# Patient Record
Sex: FEMALE
[De-identification: names, ages, dates, MRNs, and addresses within clinical notes are randomized per-mention and may not be internally consistent; named-entity substitution may affect disease eponyms.]

---

## 2017-02-21 ENCOUNTER — APPOINTMENT (OUTPATIENT)
Dept: PSYCHIATRY | Facility: CLINIC | Age: 47
End: 2017-02-21

## 2017-03-06 ENCOUNTER — APPOINTMENT (OUTPATIENT)
Dept: PSYCHIATRY | Facility: CLINIC | Age: 47
End: 2017-03-06

## 2017-03-21 ENCOUNTER — APPOINTMENT (OUTPATIENT)
Dept: PSYCHIATRY | Facility: CLINIC | Age: 47
End: 2017-03-21

## 2017-03-21 DIAGNOSIS — F33.9 MAJOR DEPRESSIVE DISORDER, RECURRENT, UNSPECIFIED: ICD-10-CM

## 2017-04-18 ENCOUNTER — APPOINTMENT (OUTPATIENT)
Dept: PSYCHIATRY | Facility: CLINIC | Age: 47
End: 2017-04-18

## 2017-04-27 ENCOUNTER — APPOINTMENT (OUTPATIENT)
Dept: PSYCHIATRY | Facility: CLINIC | Age: 47
End: 2017-04-27

## 2017-04-27 RX ORDER — LAMOTRIGINE 100 MG/1
100 TABLET ORAL DAILY
Qty: 30 | Refills: 0 | Status: DISCONTINUED | COMMUNITY
Start: 2017-03-21 | End: 2017-04-27

## 2017-05-11 ENCOUNTER — APPOINTMENT (OUTPATIENT)
Dept: PSYCHIATRY | Facility: CLINIC | Age: 47
End: 2017-05-11
Payer: COMMERCIAL

## 2017-05-11 PROCEDURE — 99214 OFFICE O/P EST MOD 30 MIN: CPT

## 2017-05-11 RX ORDER — LURASIDONE HYDROCHLORIDE 20 MG/1
20 TABLET, FILM COATED ORAL
Qty: 30 | Refills: 0 | Status: DISCONTINUED | COMMUNITY
Start: 2017-04-27 | End: 2017-05-11

## 2017-06-20 ENCOUNTER — APPOINTMENT (OUTPATIENT)
Dept: PSYCHIATRY | Facility: CLINIC | Age: 47
End: 2017-06-20

## 2017-06-20 RX ORDER — METHYLPREDNISOLONE 4 MG/1
4 TABLET ORAL
Qty: 21 | Refills: 0 | Status: DISCONTINUED | COMMUNITY
Start: 2017-04-10

## 2017-06-20 RX ORDER — AZITHROMYCIN 250 MG/1
250 TABLET, FILM COATED ORAL
Qty: 6 | Refills: 0 | Status: DISCONTINUED | COMMUNITY
Start: 2017-04-10

## 2017-06-20 RX ORDER — BUPROPION HYDROCHLORIDE 300 MG/1
300 TABLET, EXTENDED RELEASE ORAL
Qty: 30 | Refills: 0 | Status: DISCONTINUED | COMMUNITY
Start: 2017-03-06

## 2017-06-20 RX ORDER — LAMOTRIGINE 25 MG/1
25 TABLET ORAL
Qty: 90 | Refills: 0 | Status: DISCONTINUED | COMMUNITY
Start: 2017-03-21

## 2017-07-21 ENCOUNTER — APPOINTMENT (OUTPATIENT)
Dept: PSYCHIATRY | Facility: CLINIC | Age: 47
End: 2017-07-21

## 2017-10-27 ENCOUNTER — APPOINTMENT (OUTPATIENT)
Dept: PSYCHIATRY | Facility: CLINIC | Age: 47
End: 2017-10-27
Payer: COMMERCIAL

## 2017-10-27 PROCEDURE — 99214 OFFICE O/P EST MOD 30 MIN: CPT

## 2018-02-22 ENCOUNTER — APPOINTMENT (OUTPATIENT)
Dept: PSYCHIATRY | Facility: CLINIC | Age: 48
End: 2018-02-22
Payer: COMMERCIAL

## 2018-02-22 PROCEDURE — 99214 OFFICE O/P EST MOD 30 MIN: CPT

## 2018-02-22 RX ORDER — LAMOTRIGINE 200 MG/1
200 TABLET ORAL
Qty: 30 | Refills: 0 | Status: DISCONTINUED | COMMUNITY
Start: 2017-07-21

## 2018-06-12 ENCOUNTER — APPOINTMENT (OUTPATIENT)
Dept: PSYCHIATRY | Facility: CLINIC | Age: 48
End: 2018-06-12
Payer: COMMERCIAL

## 2018-06-12 PROCEDURE — 99214 OFFICE O/P EST MOD 30 MIN: CPT

## 2018-06-12 RX ORDER — LAMOTRIGINE 150 MG/1
150 TABLET ORAL
Qty: 60 | Refills: 2 | Status: DISCONTINUED | COMMUNITY
Start: 2017-05-11 | End: 2018-06-12

## 2018-06-12 RX ORDER — BUPROPION HYDROCHLORIDE 150 MG/1
150 TABLET, EXTENDED RELEASE ORAL
Qty: 90 | Refills: 0 | Status: DISCONTINUED | COMMUNITY
Start: 2017-10-27

## 2018-06-12 RX ORDER — CHLORHEXIDINE GLUCONATE, 0.12% ORAL RINSE 1.2 MG/ML
0.12 SOLUTION DENTAL
Qty: 473 | Refills: 0 | Status: DISCONTINUED | COMMUNITY
Start: 2018-05-08

## 2018-10-02 ENCOUNTER — APPOINTMENT (OUTPATIENT)
Dept: PSYCHIATRY | Facility: CLINIC | Age: 48
End: 2018-10-02
Payer: COMMERCIAL

## 2018-10-02 PROCEDURE — 99214 OFFICE O/P EST MOD 30 MIN: CPT

## 2019-01-23 ENCOUNTER — APPOINTMENT (OUTPATIENT)
Dept: PSYCHIATRY | Facility: CLINIC | Age: 49
End: 2019-01-23

## 2019-02-13 ENCOUNTER — APPOINTMENT (OUTPATIENT)
Dept: PSYCHIATRY | Facility: CLINIC | Age: 49
End: 2019-02-13
Payer: COMMERCIAL

## 2019-02-13 PROCEDURE — 99214 OFFICE O/P EST MOD 30 MIN: CPT

## 2019-05-31 ENCOUNTER — APPOINTMENT (OUTPATIENT)
Dept: PSYCHIATRY | Facility: CLINIC | Age: 49
End: 2019-05-31
Payer: COMMERCIAL

## 2019-05-31 DIAGNOSIS — Z59.8 XOTHER PROBLEMS RELATED TO HOUSING AND ECONOMIC CIRCUMSTANCES: ICD-10-CM

## 2019-05-31 PROCEDURE — 99214 OFFICE O/P EST MOD 30 MIN: CPT

## 2019-05-31 SDOH — ECONOMIC STABILITY - INCOME SECURITY: OTHER PROBLEMS RELATED TO HOUSING AND ECONOMIC CIRCUMSTANCES: Z59.8

## 2019-10-21 ENCOUNTER — APPOINTMENT (OUTPATIENT)
Dept: PSYCHIATRY | Facility: CLINIC | Age: 49
End: 2019-10-21
Payer: COMMERCIAL

## 2019-10-21 DIAGNOSIS — Z63.0 PROBLEMS IN RELATIONSHIP WITH SPOUSE OR PARTNER: ICD-10-CM

## 2019-10-21 DIAGNOSIS — F31.81 BIPOLAR II DISORDER: ICD-10-CM

## 2019-10-21 DIAGNOSIS — F33.9 MAJOR DEPRESSIVE DISORDER, RECURRENT, UNSPECIFIED: ICD-10-CM

## 2019-10-21 PROCEDURE — 99214 OFFICE O/P EST MOD 30 MIN: CPT

## 2019-10-21 SDOH — SOCIAL STABILITY - SOCIAL INSECURITY: PROBLEMS IN RELATIONSHIP WITH SPOUSE OR PARTNER: Z63.0

## 2021-05-11 ENCOUNTER — APPOINTMENT (OUTPATIENT)
Dept: OTOLARYNGOLOGY | Facility: CLINIC | Age: 51
End: 2021-05-11
Payer: COMMERCIAL

## 2021-05-11 VITALS
WEIGHT: 187 LBS | TEMPERATURE: 96.4 F | BODY MASS INDEX: 34.41 KG/M2 | HEIGHT: 62 IN | SYSTOLIC BLOOD PRESSURE: 141 MMHG | HEART RATE: 77 BPM | DIASTOLIC BLOOD PRESSURE: 93 MMHG

## 2021-05-11 DIAGNOSIS — Z78.9 OTHER SPECIFIED HEALTH STATUS: ICD-10-CM

## 2021-05-11 DIAGNOSIS — J30.2 OTHER SEASONAL ALLERGIC RHINITIS: ICD-10-CM

## 2021-05-11 DIAGNOSIS — H60.63 UNSPECIFIED CHRONIC OTITIS EXTERNA, BILATERAL: ICD-10-CM

## 2021-05-11 PROCEDURE — 99072 ADDL SUPL MATRL&STAF TM PHE: CPT

## 2021-05-11 PROCEDURE — 99203 OFFICE O/P NEW LOW 30 MIN: CPT | Mod: 25

## 2021-05-11 PROCEDURE — 69210 REMOVE IMPACTED EAR WAX UNI: CPT

## 2021-05-11 RX ORDER — BUPROPION HYDROCHLORIDE 100 MG/1
TABLET, FILM COATED ORAL
Refills: 0 | Status: ACTIVE | COMMUNITY

## 2021-05-11 NOTE — ASSESSMENT
[FreeTextEntry1] : CI removed\par stop flushing ears\par rx elocon\par \par eval for correction of deviated nasal septum

## 2021-05-11 NOTE — PHYSICAL EXAM
[] : septum deviated to the left [Normal] : mucosa is normal [Midline] : trachea located in midline position [de-identified] : dry flaky skin, CI AU

## 2021-05-11 NOTE — HISTORY OF PRESENT ILLNESS
[de-identified] : 50 yr old female w frequent otitis the past few years. Assoc w swelling of ear canal and pain tx w ear drops each time. Lots of itch. Uses a bulb syringe to flush her ears when they bother her every few weeks.\par \par c/o snoring, left nasal obstruction for a few years\par +seasonal allergy\par -hx sinusitis, epistaxis\par +nasal fx 17 yrs old followed by rhinoplasty

## 2021-05-11 NOTE — REVIEW OF SYSTEMS
[Seasonal Allergies] : seasonal allergies [Ear Pain] : ear pain [Recurrent Ear Infections] : recurrent ear infections [Nasal Congestion] : nasal congestion [Problem Snoring] : problem snoring [Sinus Pressure] : sinus pressure [Snoring With Pauses] : snoring with pauses [Throat Clearing] : throat clearing [Eyes Itch] : itching of the eyes [Shortness Of Breath] : shortness of breath [Depression] : depression [Hot Flashes] : hot flashes [Negative] : Heme/Lymph [As Noted in HPI] : as noted in HPI [FreeTextEntry7] : difficulty swallowing [de-identified] : hair changes

## 2021-05-17 ENCOUNTER — APPOINTMENT (OUTPATIENT)
Dept: OTOLARYNGOLOGY | Facility: CLINIC | Age: 51
End: 2021-05-17
Payer: COMMERCIAL

## 2021-05-17 ENCOUNTER — RESULT REVIEW (OUTPATIENT)
Age: 51
End: 2021-05-17

## 2021-05-17 VITALS
SYSTOLIC BLOOD PRESSURE: 147 MMHG | HEIGHT: 62 IN | WEIGHT: 187 LBS | TEMPERATURE: 96.7 F | HEART RATE: 71 BPM | BODY MASS INDEX: 34.41 KG/M2 | DIASTOLIC BLOOD PRESSURE: 90 MMHG

## 2021-05-17 PROCEDURE — 99072 ADDL SUPL MATRL&STAF TM PHE: CPT

## 2021-05-17 PROCEDURE — 70486 CT MAXILLOFACIAL W/O DYE: CPT

## 2021-05-17 PROCEDURE — 99215 OFFICE O/P EST HI 40 MIN: CPT

## 2021-05-17 NOTE — PHYSICAL EXAM
[Midline] : trachea located in midline position [Normal] : no rashes [FreeTextEntry1] : severe leftward deviated septum particularly involving maxillary crest, right deviated septal cartilage, large turbinates [FreeTextEntry2] : sinuses nontender to percussion

## 2021-05-17 NOTE — ASSESSMENT
[FreeTextEntry1] : Reviewed and reconciled medications, allergies, PMHx, PSHx, SocHx, FMHx.\par \par h/o rhinoplasty and nasal fx at 17 years old, snoring, nasal obstruction\par difficulty breathing through the nose\par severe leftward deviated septum particularly involving maxillary crest, right deviated septal cartilage\par large turbinates\par \par Plan:\par Reviewed chart notes from Dr. Rocha. Mini CT sinus - interpreted by Dr. Sheffield and reviewed with the patient, pending radiologist review.  Flonase - 2 sprays bilaterally QD, spray laterally. Discussed r/b/a of septum and turbs.

## 2021-05-17 NOTE — REASON FOR VISIT
[Subsequent Evaluation] : a subsequent evaluation for [FreeTextEntry2] : discuss surgery deviated septum

## 2021-05-17 NOTE — HISTORY OF PRESENT ILLNESS
[de-identified] : The patient presents with h/o rhinoplasty and nasal fx at 17 years old, snoring, nasal obstruction. Pt has difficulty breathing through the nose. She denies having injured the nose after the rhinoplasty. She uses saline spray.

## 2021-05-17 NOTE — CONSULT LETTER
[Dear  ___] : Dear  [unfilled], [Courtesy Letter:] : I had the pleasure of seeing your patient, [unfilled], in my office today. [Please see my note below.] : Please see my note below. [Consult Closing:] : Thank you very much for allowing me to participate in the care of this patient.  If you have any questions, please do not hesitate to contact me. [Sincerely,] : Sincerely, [FreeTextEntry3] : Humberto Sheffield MD FACS

## 2021-05-17 NOTE — ADDENDUM
[FreeTextEntry1] : Documented by Lilly Morse acting as scribe for Dr. Sheffield on 05/17/2021.\par \par All Medical record entries made by the Scribe were at my, Dr. Sheffield, direction and personally dictated by me on 05/17/2021 . I have reviewed the chart and agree that the record accurately reflects my personal performance of the history, physical exam, assessment and plan. I have also personally directed, reviewed, and agreed with the discharge instructions.

## 2021-05-17 NOTE — PROCEDURE
[FreeTextEntry1] : CT sinus [FreeTextEntry2] : difficulty breathing [FreeTextEntry3] : deviated septum with a spur - left side touching the side wall, sinuses clear

## 2021-09-09 ENCOUNTER — APPOINTMENT (OUTPATIENT)
Dept: OTOLARYNGOLOGY | Facility: AMBULATORY MEDICAL SERVICES | Age: 51
End: 2021-09-09

## 2021-09-17 ENCOUNTER — APPOINTMENT (OUTPATIENT)
Dept: OTOLARYNGOLOGY | Facility: CLINIC | Age: 51
End: 2021-09-17

## 2021-11-18 ENCOUNTER — APPOINTMENT (OUTPATIENT)
Dept: OTOLARYNGOLOGY | Facility: AMBULATORY MEDICAL SERVICES | Age: 51
End: 2021-11-18
Payer: COMMERCIAL

## 2021-11-18 DIAGNOSIS — G89.18 OTHER ACUTE POSTPROCEDURAL PAIN: ICD-10-CM

## 2021-11-18 PROCEDURE — 31240 NSL/SNS NDSC CNCH BULL RESCJ: CPT | Mod: 50

## 2021-11-18 PROCEDURE — 31040 EXPLORATION BEHIND UPPER JAW: CPT | Mod: 50

## 2021-11-18 PROCEDURE — 30520 REPAIR OF NASAL SEPTUM: CPT

## 2021-11-29 ENCOUNTER — APPOINTMENT (OUTPATIENT)
Dept: OTOLARYNGOLOGY | Facility: CLINIC | Age: 51
End: 2021-11-29
Payer: COMMERCIAL

## 2021-11-29 VITALS
WEIGHT: 194 LBS | HEART RATE: 94 BPM | HEIGHT: 62 IN | SYSTOLIC BLOOD PRESSURE: 145 MMHG | DIASTOLIC BLOOD PRESSURE: 90 MMHG | BODY MASS INDEX: 35.7 KG/M2

## 2021-11-29 PROCEDURE — 99024 POSTOP FOLLOW-UP VISIT: CPT

## 2021-11-29 RX ORDER — ACETAMINOPHEN AND CODEINE 300; 30 MG/1; MG/1
300-30 TABLET ORAL
Qty: 30 | Refills: 0 | Status: COMPLETED | COMMUNITY
Start: 2021-11-18 | End: 2021-11-29

## 2021-11-29 RX ORDER — CEPHALEXIN 500 MG/1
500 CAPSULE ORAL
Qty: 20 | Refills: 0 | Status: COMPLETED | COMMUNITY
Start: 2021-11-18 | End: 2021-11-29

## 2021-11-29 RX ORDER — FLUTICASONE PROPIONATE 50 UG/1
50 SPRAY, METERED NASAL
Qty: 16 | Refills: 5 | Status: COMPLETED | COMMUNITY
Start: 2021-05-17 | End: 2021-11-29

## 2021-11-29 RX ORDER — MOMETASONE FUROATE 1 MG/G
0.1 CREAM TOPICAL TWICE DAILY
Qty: 1 | Refills: 1 | Status: COMPLETED | COMMUNITY
Start: 2021-05-11 | End: 2021-11-29

## 2021-11-29 NOTE — PHYSICAL EXAM
[Normal] : normal appearance, well groomed, well nourished, and in no acute distress [FreeTextEntry1] : Nasal splints in place, dry blood and mucous B/L

## 2021-11-29 NOTE — HISTORY OF PRESENT ILLNESS
[de-identified] : 51 y.o female presents s/p septal reconstruction, B/L SMR and partial inferior turbinectomy, and a B/l endoscopic  reduction of middle turbinate tylor bullosa on 11/18/21. She is using saline spray multiple times a day. She feels congested and some mild pain.

## 2021-11-29 NOTE — ASSESSMENT
[FreeTextEntry1] : Reviewed and reconciled meds, allergies, Pmhx, Famhx, Sochx, Surghx\par \par 51 y.o female presents s/p septal reconstruction, B/L SMR and partial inferior turbinectomy, and a B/l endoscopic  reduction of middle turbinate tylor bullosa on 11/18/21\par \par Nasal Splints in place\par Mucous/debris and dry blood suctioned and removed with bayonet\par \par \par \par Plan\par Continue saline spray\par No nose blowing\par F/u in 1 weeks for removal of nasal splints

## 2021-12-06 ENCOUNTER — APPOINTMENT (OUTPATIENT)
Dept: OTOLARYNGOLOGY | Facility: CLINIC | Age: 51
End: 2021-12-06
Payer: COMMERCIAL

## 2021-12-06 VITALS
HEART RATE: 86 BPM | DIASTOLIC BLOOD PRESSURE: 93 MMHG | BODY MASS INDEX: 35.7 KG/M2 | HEIGHT: 62 IN | SYSTOLIC BLOOD PRESSURE: 139 MMHG | WEIGHT: 194 LBS

## 2021-12-06 PROCEDURE — 99024 POSTOP FOLLOW-UP VISIT: CPT

## 2021-12-06 NOTE — HISTORY OF PRESENT ILLNESS
[de-identified] : \par 51 y.o female presents s/p septal reconstruction, B/L SMR and partial inferior turbinectomy, and a B/l endoscopic reduction of middle turbinate tylor bullosa on 11/18/21. She is using saline spray multiple times a day. She feels congested and some mild pain. \par \par Pt here to follow up, she is still using saline spray

## 2021-12-06 NOTE — ASSESSMENT
[FreeTextEntry1] : \par 51 y.o female presents s/p septal reconstruction, B/L SMR and partial inferior turbinectomy, and a B/l endoscopic reduction of middle turbinate tylor bullosa on 11/18/21. She is using saline spray multiple times a day. She feels congested and some mild pain. \par \par Pt here to follow up, she is still using saline spray\par \par stitches cut,removal of nasal splints bilaterally,\par nasal suction clear\par \par \par \par Plan:\par remaining stitches will dissolve on their own, recheck again in a few weeks, continue using saline nasal spray, \par able to blow nose \par

## 2021-12-06 NOTE — PROCEDURE
[FreeTextEntry1] : removal of stitches [FreeTextEntry2] : post surgery [FreeTextEntry3] : stitches cut, removal of nasal splints, suction of mucus, crust and debris

## 2021-12-06 NOTE — PHYSICAL EXAM
[FreeTextEntry1] : splints on both sides of the septum which will be removed [Midline] : trachea located in midline position [de-identified] : sensations intact [Normal] : ocular motility and gaze are normal

## 2021-12-22 ENCOUNTER — APPOINTMENT (OUTPATIENT)
Dept: OTOLARYNGOLOGY | Facility: CLINIC | Age: 51
End: 2021-12-22
Payer: COMMERCIAL

## 2021-12-22 VITALS
BODY MASS INDEX: 35.7 KG/M2 | HEIGHT: 62 IN | HEART RATE: 78 BPM | WEIGHT: 194 LBS | DIASTOLIC BLOOD PRESSURE: 92 MMHG | SYSTOLIC BLOOD PRESSURE: 157 MMHG

## 2021-12-22 DIAGNOSIS — J34.3 HYPERTROPHY OF NASAL TURBINATES: ICD-10-CM

## 2021-12-22 DIAGNOSIS — Z98.890 OTHER SPECIFIED POSTPROCEDURAL STATES: ICD-10-CM

## 2021-12-22 DIAGNOSIS — J34.2 DEVIATED NASAL SEPTUM: ICD-10-CM

## 2021-12-22 PROCEDURE — 99024 POSTOP FOLLOW-UP VISIT: CPT

## 2021-12-22 NOTE — REASON FOR VISIT
[Post-Operative Visit] : a post-operative visit [FreeTextEntry2] : Patient here for a post operative fu

## 2021-12-22 NOTE — PHYSICAL EXAM
[Normal] : no masses and lesions seen, face is symmetric [FreeTextEntry1] : wide open and clear. little dry mucus. can see all the way back.  [FreeTextEntry2] : Sinuses nontender to percussion.

## 2021-12-22 NOTE — ADDENDUM
[FreeTextEntry1] : Documented by Milton Cotto acting as scribe for Dr. Sheffield on 12/22/2021.\par \par All Medical record entries made by the Scribe were at my, Dr. Sheffield, direction and personally dictated by me on 12/22/2021. I have reviewed the chart and agree that the record accurately reflects my personal performance of the history, physical exam, assessment and plan. I have also personally directed, reviewed, and agreed with the discharge instructions.

## 2021-12-22 NOTE — ASSESSMENT
[FreeTextEntry1] : Reviewed and reconciled medications, allergies, PMHx, PSHx, SocHx, FMHx. \par \par s/p septal reconstruction, B/L SMR and partial inferior turbinectomy, and a B/l endoscopic reduction of middle turbinate tylor bullosa on 11/18/21- wide open and clear. little dry mucus, can see all the way back\par pathology report: all benign \par \par Plan:\par Can start blowing more. Use more steam in the shower. Continue use of saline spray. FU 3-4 months.

## 2021-12-22 NOTE — HISTORY OF PRESENT ILLNESS
[de-identified] : The patient presents s/p s/p septal reconstruction, B/L SMR and partial inferior turbinectomy, and a B/l endoscopic reduction of middle turbinate tylor bullosa on 11/18/21. Pt reports doing much better. Pt states that she does feel a little plug sensation sometimes in her nose.

## 2022-03-29 ENCOUNTER — APPOINTMENT (OUTPATIENT)
Dept: OTOLARYNGOLOGY | Facility: CLINIC | Age: 52
End: 2022-03-29
Payer: COMMERCIAL

## 2022-03-29 VITALS
HEIGHT: 62 IN | WEIGHT: 200 LBS | DIASTOLIC BLOOD PRESSURE: 94 MMHG | SYSTOLIC BLOOD PRESSURE: 141 MMHG | BODY MASS INDEX: 36.8 KG/M2 | HEART RATE: 84 BPM

## 2022-03-29 DIAGNOSIS — J34.89 OTHER SPECIFIED DISORDERS OF NOSE AND NASAL SINUSES: ICD-10-CM

## 2022-03-29 DIAGNOSIS — S02.2XXS FRACTURE OF NASAL BONES, SEQUELA: ICD-10-CM

## 2022-03-29 DIAGNOSIS — J31.0 CHRONIC RHINITIS: ICD-10-CM

## 2022-03-29 PROCEDURE — 69210 REMOVE IMPACTED EAR WAX UNI: CPT

## 2022-03-29 PROCEDURE — 31231 NASAL ENDOSCOPY DX: CPT

## 2022-03-29 PROCEDURE — 99214 OFFICE O/P EST MOD 30 MIN: CPT | Mod: 25

## 2022-03-29 NOTE — PHYSICAL EXAM
[Normal] : mucosa is normal [Midline] : trachea located in midline position [FreeTextEntry8] : cerumen impaction removed with curettage and suction- peroxide drops placed and suctioned out [FreeTextEntry9] : cerumen impaction removed with curettage [FreeTextEntry1] : nasal valve collapse on left side [FreeTextEntry2] : Sinuses nontender to percussion.

## 2022-03-29 NOTE — ADDENDUM
[FreeTextEntry1] : Documented by Milton Cotto acting as scribe for Dr. Sheffield on 03/29/2022.\par \par All Medical record entries made by the Scribe were at my, Dr. Sheffield, direction and personally dictated by me on 03/29/2022. I have reviewed the chart and agree that the record accurately reflects my personal performance of the history, physical exam, assessment and plan. I have also personally directed, reviewed, and agreed with the discharge instructions.

## 2022-03-29 NOTE — HISTORY OF PRESENT ILLNESS
[de-identified] : Patient s/p septal reconstruction, B/L SMR and partial inferior turbinectomy, and a B/l endoscopic reduction of middle turbinate tylor bullosa on 11/18/21. Patient presents today complaining of having trouble breathing from left side of nose and having a clogged feeling in the right ear. Pt also reports that she feels like the back of her nose is "no longer connected" to her throat.

## 2022-03-29 NOTE — ASSESSMENT
[FreeTextEntry1] : Reviewed and reconciled medications, allergies, PMHx, PSHx, SocHx, FMHx.\par \par s/p septal reconstruction, B/L SMR and partial inferior turbinectomy, and a B/l endoscopic reduction of middle turbinate tylor bullosa on 11/18/21. 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 Flexible Nasal Endoscopy. Discussed use of sinus cones (medium sized) or Breathe Right Strips. Discussed r/b/a of nasal valve repair using Latera.

## 2022-03-29 NOTE — PROCEDURE
[Recalcitrant Symptoms] : recalcitrant symptoms  [Post-Op Patency] : post-op patency [Anterior rhinoscopy insufficient to account for symptoms] : anterior rhinoscopy insufficient to account for symptoms [None] : none [Flexible Endoscope] : examined with the flexible endoscope [FreeTextEntry6] : Procedure: Flexible Nasal Endoscopy: Risks, benefits, and alternatives of flexible endoscopy were explained to the patient. The patient gave oral consent to proceed. The flexible scope was inserted into the right nasal cavity. Endoscopy of the inferior and middle meatus was performed. No polyp, mass, or lesion was appreciated. Olfactory cleft was clear. Spheno-ethmoid recess is clear. Clear back to the nasopharynx. No scarring. No polyps or growths. Nasopharynx was clear. Turbinates were without mass. The procedure was repeated on the contralateral side with similar findings.

## 2022-04-12 ENCOUNTER — TRANSCRIPTION ENCOUNTER (OUTPATIENT)
Age: 52
End: 2022-04-12

## 2023-07-06 ENCOUNTER — APPOINTMENT (OUTPATIENT)
Dept: OTOLARYNGOLOGY | Facility: CLINIC | Age: 53
End: 2023-07-06
Payer: COMMERCIAL

## 2023-07-06 VITALS
WEIGHT: 197 LBS | SYSTOLIC BLOOD PRESSURE: 139 MMHG | DIASTOLIC BLOOD PRESSURE: 91 MMHG | HEIGHT: 62 IN | BODY MASS INDEX: 36.25 KG/M2 | HEART RATE: 81 BPM

## 2023-07-06 DIAGNOSIS — R13.14 DYSPHAGIA, PHARYNGOESOPHAGEAL PHASE: ICD-10-CM

## 2023-07-06 DIAGNOSIS — H61.23 IMPACTED CERUMEN, BILATERAL: ICD-10-CM

## 2023-07-06 DIAGNOSIS — R06.83 SNORING: ICD-10-CM

## 2023-07-06 PROCEDURE — 99214 OFFICE O/P EST MOD 30 MIN: CPT | Mod: 25

## 2023-07-06 PROCEDURE — 31575 DIAGNOSTIC LARYNGOSCOPY: CPT

## 2023-07-06 NOTE — PHYSICAL EXAM
[Normal] : mucosa is normal [Midline] : trachea located in midline position [FreeTextEntry1] : obese [de-identified] : CI AU [de-identified] : large [de-identified] : 2+

## 2023-07-06 NOTE — HISTORY OF PRESENT ILLNESS
[de-identified] : 52 yr old female c/o fullness in her throat, has difficulty breathing when she lies down since about April 2022\par +dysphagia for solids, especially pills\par -weight loss\par -hoarseness, cough\par rare heartburn, only if she eats late at night\par \par +hx cigs 1ppd for 30 years until 10 yrs ago, now smokes e-cigs\par \par +snoring, snorting, nighttime awakening\par 5'2"\par 197 lbs (stable)

## 2023-07-18 ENCOUNTER — APPOINTMENT (OUTPATIENT)
Dept: OTOLARYNGOLOGY | Facility: CLINIC | Age: 53
End: 2023-07-18